# Patient Record
Sex: FEMALE | Race: WHITE | URBAN - METROPOLITAN AREA
[De-identification: names, ages, dates, MRNs, and addresses within clinical notes are randomized per-mention and may not be internally consistent; named-entity substitution may affect disease eponyms.]

---

## 2017-01-02 ENCOUNTER — TELEPHONE (OUTPATIENT)
Dept: FAMILY MEDICINE | Facility: CLINIC | Age: 22
End: 2017-01-02

## 2017-01-02 NOTE — TELEPHONE ENCOUNTER
Notes Recorded by Snow Gunn RN on 1/2/2017 at 11:02 AM  L/M to call.  See telephone encounter.  Snow Gunn RN    ------    Notes Recorded by Asha Dill APRN CNP on 1/1/2017 at 2:26 PM  Please call,  Liana,  The repeat DHEA sulfate level was normal.  Your cortisol was slightly elevated.  I would like you to do a dexamethasone suppression test.  Please  a prescription for a 1 mg tablet of dexamethasone at your pharmacy.  Make a lab only appointment for an 8am cortisol level.  Please take the 1 mg dexamethasone tablet at 11pm the night before the lab appointment.  I'll let you know results when available.  Asha Dill NP  Endocrinology

## 2017-01-06 DIAGNOSIS — R79.89 ELEVATED CORTISOL LEVEL: ICD-10-CM

## 2017-01-06 LAB — CORTIS SERPL-MCNC: 1.5 UG/DL (ref 4–22)

## 2017-01-06 PROCEDURE — 82533 TOTAL CORTISOL: CPT | Performed by: FAMILY MEDICINE

## 2017-01-06 PROCEDURE — 36415 COLL VENOUS BLD VENIPUNCTURE: CPT | Performed by: FAMILY MEDICINE

## 2017-01-06 PROCEDURE — 99000 SPECIMEN HANDLING OFFICE-LAB: CPT | Performed by: FAMILY MEDICINE

## 2017-01-08 NOTE — PROGRESS NOTES
Quick Note:    Please call  Liana,  Your cortisol test was normal. The cortisol is suppose to suppress below 1.8 ug/dL following the administration of Dexamethasone at 11pm the night before the test.  So far, your tests have all been normal.  I would like you to schedule a follow up appointment in 3 months for a recheck. I just want to make sure everything remains stable.   Let me know if you have any questions.  Asha Dill NP  Endocrinology    ______

## 2018-01-07 ENCOUNTER — HEALTH MAINTENANCE LETTER (OUTPATIENT)
Age: 23
End: 2018-01-07

## 2018-07-05 ENCOUNTER — TRANSFERRED RECORDS (OUTPATIENT)
Dept: HEALTH INFORMATION MANAGEMENT | Facility: CLINIC | Age: 23
End: 2018-07-05

## 2018-07-09 ENCOUNTER — MYC MEDICAL ADVICE (OUTPATIENT)
Dept: ENDOCRINOLOGY | Facility: CLINIC | Age: 23
End: 2018-07-09

## 2020-03-10 ENCOUNTER — HEALTH MAINTENANCE LETTER (OUTPATIENT)
Age: 25
End: 2020-03-10

## 2020-12-27 ENCOUNTER — HEALTH MAINTENANCE LETTER (OUTPATIENT)
Age: 25
End: 2020-12-27

## 2021-04-24 ENCOUNTER — HEALTH MAINTENANCE LETTER (OUTPATIENT)
Age: 26
End: 2021-04-24

## 2021-10-09 ENCOUNTER — HEALTH MAINTENANCE LETTER (OUTPATIENT)
Age: 26
End: 2021-10-09

## 2022-05-16 ENCOUNTER — HEALTH MAINTENANCE LETTER (OUTPATIENT)
Age: 27
End: 2022-05-16

## 2022-09-11 ENCOUNTER — HEALTH MAINTENANCE LETTER (OUTPATIENT)
Age: 27
End: 2022-09-11

## 2023-06-03 ENCOUNTER — HEALTH MAINTENANCE LETTER (OUTPATIENT)
Age: 28
End: 2023-06-03